# Patient Record
Sex: MALE | Race: WHITE | HISPANIC OR LATINO | URBAN - METROPOLITAN AREA
[De-identification: names, ages, dates, MRNs, and addresses within clinical notes are randomized per-mention and may not be internally consistent; named-entity substitution may affect disease eponyms.]

---

## 2017-06-08 VITALS
RESPIRATION RATE: 15 BRPM | HEART RATE: 58 BPM | SYSTOLIC BLOOD PRESSURE: 108 MMHG | OXYGEN SATURATION: 98 % | DIASTOLIC BLOOD PRESSURE: 65 MMHG | TEMPERATURE: 98 F

## 2017-06-08 RX ORDER — CHLORHEXIDINE GLUCONATE 213 G/1000ML
1 SOLUTION TOPICAL ONCE
Qty: 0 | Refills: 0 | Status: DISCONTINUED | OUTPATIENT
Start: 2017-06-09 | End: 2017-06-09

## 2017-06-08 NOTE — H&P ADULT - HISTORY OF PRESENT ILLNESS
History obtained from patient’s wife, **Wife bringing in all patient's medications, please confirm on arrival    68 y/o M, Samoan speaking, Former smoker (1ppd, quit 40 years ago) with PMHx HTN, HLD, DM, known CAD s/p multiple PCIs,  Alzheimer’s (diagnosed 2016), COPD presenting to cardiologist c/o stabbing non-exertional chest pain associated with SOB and dizziness. The pain is described as left-sided and mid-sternal burning/tightness unrelated to activity. The patient can only ambulate < 1 block before he must stop secondary to the CP and SOB. Pt denies diaphoresis, fatigue, LE edema, palpitations, orthopnea, PND, syncope. The patient’s last cardiac cath was a diagnostic Cardiac Cath 7/3/16 @ [place] revealing 2 vessel CAD (RPDA 30-50%, RPL1 90-95%, D1 50-60%, FFR of D1 0.88, patent stents in LCx, RPDA, proxLAD, EF 60%. Echocardiogram 1/5/17 showed impaired relaxation, mild mitral annular calcification, EF 56%, mild LVH. Duplex Carotid Ultrasound 1/5/17: Mild plaque with no evidence of significant stenosis. Echocardiogram from 4/18/17 showed normal LV function, moderate mitral annular calcification, mild LVH, indicative of abnormal diastolic filling pattern. In light of patient risk factors, NST results (report pending), and known CAD, patient is referred for recommended cardiac catheterization with possible intervention if clinically indicated. History obtained from patient’s wife, **Wife bringing in all patient's medications, please confirm on arrival    66 y/o M, Bulgarian speaking, Former smoker (1ppd, quit 40 years ago) with PMHx HTN, HLD, DM, known CAD s/p multiple PCIs (last intervention done in 2014 to the LAD),  Alzheimer’s (as per wife, diagnosed 2016), COPD presenting to cardiologist c/o stabbing chest pain associated with SOB and dizziness. The pain is described as left-sided and mid-sternal burning/tightness unrelated to activity. The patient can only ambulate < 1 block before he must stop secondary to the CP and SOB. He takes sublingual nitro which usually relieves his pain after the second dose. Pt denies diaphoresis, fatigue, LE edema, palpitations, orthopnea, PND, syncope. The patient’s last cardiac cath was a diagnostic Cardiac Cath 7/3/16 @ [place] revealing 2 vessel CAD (RPDA 30-50%, RPL1 90-95%, D1 50-60%, FFR of D1 0.88, patent stents in LCx, RPDA, proxLAD, EF 60%. The patient had NST 2/3/17 showing positive stress EKG with frequent PVC's throughout the exam. Small, mild, inferior wall defect that is partially reversible. Normal wall motion however cannot rule out mild ischemia, EF 59%. Since this stress test, he has had worsening episodes of this chest pain. In light of patient risk factors, NST results, and known CAD, patient is referred for recommended cardiac catheterization with possible intervention if clinically indicated.     Other Imaging Studies:  Echocardiogram 1/5/17 showed no pericardial effusion, mild mitral annular calcification, EF 56%, mild LVH, moderate MR, mild TR, E:A reversal consistent with impaired relaxation.   Duplex Carotid Ultrasound 1/5/17: Mild plaque with no evidence of significant stenosis.   Echocardiogram from 4/18/17 showed normal LV function, moderate mitral annular calcification, mild LVH, E:A reversal indicative of abnormal diastolic filling pattern. History obtained from patient’s wife    66 y/o M, Hungarian speaking, Former smoker (1ppd, quit 40 years ago) with PMHx HTN, HLD, DM, known CAD s/p multiple PCIs (last intervention done in 2014 to the LAD),  Alzheimer’s (as per wife, diagnosed 2016), COPD presenting to cardiologist c/o stabbing chest pain associated with SOB and dizziness. The pain is described as left-sided and mid-sternal burning/tightness unrelated to activity. The patient can only ambulate < 1 block before he must stop secondary to the CP and SOB. He takes sublingual nitro which usually relieves his pain after the second dose. Pt denies diaphoresis, fatigue, LE edema, palpitations, orthopnea, PND, syncope. The patient’s last cardiac cath was a diagnostic Cardiac Cath 7/3/16 @ [place] revealing 2 vessel CAD (RPDA 30-50%, RPL1 90-95%, D1 50-60%, FFR of D1 0.88, patent stents in LCx, RPDA, proxLAD, EF 60%. The patient had NST 2/3/17 showing positive stress EKG with frequent PVC's throughout the exam. Small, mild, inferior wall defect that is partially reversible. Normal wall motion however cannot rule out mild ischemia, EF 59%. Since this stress test, he has had worsening episodes of this chest pain. In light of patient risk factors, NST results, and known CAD, patient is referred for recommended cardiac catheterization with possible intervention if clinically indicated.     Other Imaging Studies:  Echocardiogram 1/5/17 showed no pericardial effusion, mild mitral annular calcification, EF 56%, mild LVH, moderate MR, mild TR, E:A reversal consistent with impaired relaxation.   Duplex Carotid Ultrasound 1/5/17: Mild plaque with no evidence of significant stenosis.   Echocardiogram from 4/18/17 showed normal LV function, moderate mitral annular calcification, mild LVH, E:A reversal indicative of abnormal diastolic filling pattern.

## 2017-06-08 NOTE — H&P ADULT - ASSESSMENT
66 y/o M, Niuean speaking, Former smoker (1ppd, quit 40 years ago) with PMHx HTN, HLD, DM, known CAD s/p multiple PCIs (last intervention done in 2014 to the LAD),  Alzheimer’s (as per wife, diagnosed 2016), COPD presenting to cardiologist c/o stabbing chest pain associated with SOB and dizziness.The patient had NST 2/3/17 showing positive stress EKG with frequent PVC's throughout the exam. Small, mild, inferior wall defect that is partially reversible. Normal wall motion however cannot rule out mild ischemia, EF 59%. Since this stress test, he has had worsening episodes of this chest pain. In light of patient risk factors, NST results, and known CAD, patient is referred for recommended cardiac catheterization with possible intervention if clinically indicated.     *Of note, patient's reports missing doses of his ASA/Plavix. Patient to be loaded with  mg and Plavix 600 mg prior to procedure. IVF @ 75cc/hr.     ASA III and Mallampati III    Consented patient and patient's wife with use of  #[id number]  Risks & benefits of procedure and alternative therapy have been explained to the patient including but not limited to: allergic reaction, bleeding w/possible need for blood transfusion, infection, renal and vascular compromise, limb damage, arrhythmia, stroke, vessel dissection/perforation, Myocardial infarction, emergent CABG. Informed consent obtained and in chart. 68 y/o M, [place] speaking, Former smoker (1ppd, quit 40 years ago) with PMHx HTN, HLD, DM, known CAD s/p multiple PCIs (last intervention done in 2014 to the LAD),  Alzheimer’s (as per wife, diagnosed 2016), COPD presenting to cardiologist c/o stabbing chest pain associated with SOB and dizziness.The patient had NST 2/3/17 showing positive stress EKG with frequent PVC's throughout the exam. Small, mild, inferior wall defect that is partially reversible. Normal wall motion however cannot rule out mild ischemia, EF 59%. Since this stress test, he has had worsening episodes of this chest pain. In light of patient risk factors, NST results, and known CAD, patient is referred for recommended cardiac catheterization with possible intervention if clinically indicated.     *Of note, patient's reports missing doses of his ASA/Plavix. Patient to be loaded with  mg and Plavix 600 mg prior to procedure. IVF @ 75cc/hr. Patient A&O x2, wife reports diagnosis of Alzheimers in 2016.    ASA III and Mallampati III    Consented patient and patient's wife with use of  #[id number]  Risks & benefits of procedure and alternative therapy have been explained to the patient including but not limited to: allergic reaction, bleeding w/possible need for blood transfusion, infection, renal and vascular compromise, limb damage, arrhythmia, stroke, vessel dissection/perforation, Myocardial infarction, emergent CABG. Informed consent obtained and in chart.

## 2017-06-09 ENCOUNTER — OUTPATIENT (OUTPATIENT)
Dept: OUTPATIENT SERVICES | Facility: HOSPITAL | Age: 67
LOS: 1 days | Discharge: NURSING, REHAB OR LONG-TERM CARE | End: 2017-06-09
Payer: MEDICARE

## 2017-06-09 DIAGNOSIS — Z90.89 ACQUIRED ABSENCE OF OTHER ORGANS: Chronic | ICD-10-CM

## 2017-06-09 DIAGNOSIS — Z98.890 OTHER SPECIFIED POSTPROCEDURAL STATES: Chronic | ICD-10-CM

## 2017-06-09 DIAGNOSIS — Z95.5 PRESENCE OF CORONARY ANGIOPLASTY IMPLANT AND GRAFT: ICD-10-CM

## 2017-06-09 DIAGNOSIS — I25.110 ATHEROSCLEROTIC HEART DISEASE OF NATIVE CORONARY ARTERY WITH UNSTABLE ANGINA PECTORIS: ICD-10-CM

## 2017-06-09 LAB
ALBUMIN SERPL ELPH-MCNC: 4.4 G/DL — SIGNIFICANT CHANGE UP (ref 3.3–5)
ALP SERPL-CCNC: 99 U/L — SIGNIFICANT CHANGE UP (ref 40–120)
ALT FLD-CCNC: 18 U/L — SIGNIFICANT CHANGE UP (ref 10–45)
ANION GAP SERPL CALC-SCNC: 10 MMOL/L — SIGNIFICANT CHANGE UP (ref 5–17)
APTT BLD: 35.1 SEC — SIGNIFICANT CHANGE UP (ref 27.5–37.4)
AST SERPL-CCNC: 14 U/L — SIGNIFICANT CHANGE UP (ref 10–40)
BASOPHILS NFR BLD AUTO: 0.1 % — SIGNIFICANT CHANGE UP (ref 0–2)
BILIRUB SERPL-MCNC: 0.4 MG/DL — SIGNIFICANT CHANGE UP (ref 0.2–1.2)
BUN SERPL-MCNC: 16 MG/DL — SIGNIFICANT CHANGE UP (ref 7–23)
CALCIUM SERPL-MCNC: 9.1 MG/DL — SIGNIFICANT CHANGE UP (ref 8.4–10.5)
CHLORIDE SERPL-SCNC: 95 MMOL/L — LOW (ref 96–108)
CHOLEST SERPL-MCNC: 232 MG/DL — HIGH (ref 10–199)
CO2 SERPL-SCNC: 28 MMOL/L — SIGNIFICANT CHANGE UP (ref 22–31)
CREAT SERPL-MCNC: 0.7 MG/DL — SIGNIFICANT CHANGE UP (ref 0.5–1.3)
CRP SERPL-MCNC: 0.3 MG/DL — SIGNIFICANT CHANGE UP (ref 0–0.4)
EOSINOPHIL NFR BLD AUTO: 1.1 % — SIGNIFICANT CHANGE UP (ref 0–6)
GLUCOSE SERPL-MCNC: 206 MG/DL — HIGH (ref 70–99)
HBA1C BLD-MCNC: 9.8 % — HIGH (ref 4–5.6)
HCT VFR BLD CALC: 43.7 % — SIGNIFICANT CHANGE UP (ref 39–50)
HDLC SERPL-MCNC: 51 MG/DL — SIGNIFICANT CHANGE UP (ref 40–125)
HGB BLD-MCNC: 15.4 G/DL — SIGNIFICANT CHANGE UP (ref 13–17)
INR BLD: 1.04 — SIGNIFICANT CHANGE UP (ref 0.88–1.16)
LIPID PNL WITH DIRECT LDL SERPL: 149 MG/DL — HIGH
LYMPHOCYTES # BLD AUTO: 35.6 % — SIGNIFICANT CHANGE UP (ref 13–44)
MCHC RBC-ENTMCNC: 31.1 PG — SIGNIFICANT CHANGE UP (ref 27–34)
MCHC RBC-ENTMCNC: 35.2 G/DL — SIGNIFICANT CHANGE UP (ref 32–36)
MCV RBC AUTO: 88.3 FL — SIGNIFICANT CHANGE UP (ref 80–100)
MONOCYTES NFR BLD AUTO: 7 % — SIGNIFICANT CHANGE UP (ref 2–14)
NEUTROPHILS NFR BLD AUTO: 56.2 % — SIGNIFICANT CHANGE UP (ref 43–77)
PLATELET # BLD AUTO: 245 K/UL — SIGNIFICANT CHANGE UP (ref 150–400)
POTASSIUM SERPL-MCNC: 4.3 MMOL/L — SIGNIFICANT CHANGE UP (ref 3.5–5.3)
POTASSIUM SERPL-SCNC: 4.3 MMOL/L — SIGNIFICANT CHANGE UP (ref 3.5–5.3)
PROT SERPL-MCNC: 8.1 G/DL — SIGNIFICANT CHANGE UP (ref 6–8.3)
PROTHROM AB SERPL-ACNC: 11.5 SEC — SIGNIFICANT CHANGE UP (ref 9.8–12.7)
RBC # BLD: 4.95 M/UL — SIGNIFICANT CHANGE UP (ref 4.2–5.8)
RBC # FLD: 12.3 % — SIGNIFICANT CHANGE UP (ref 10.3–16.9)
SODIUM SERPL-SCNC: 133 MMOL/L — LOW (ref 135–145)
TOTAL CHOLESTEROL/HDL RATIO MEASUREMENT: 4.5 RATIO — SIGNIFICANT CHANGE UP (ref 3.4–9.6)
TRIGL SERPL-MCNC: 158 MG/DL — HIGH (ref 10–149)
WBC # BLD: 7 K/UL — SIGNIFICANT CHANGE UP (ref 3.8–10.5)
WBC # FLD AUTO: 7 K/UL — SIGNIFICANT CHANGE UP (ref 3.8–10.5)

## 2017-06-09 PROCEDURE — 85025 COMPLETE CBC W/AUTO DIFF WBC: CPT

## 2017-06-09 PROCEDURE — 86140 C-REACTIVE PROTEIN: CPT

## 2017-06-09 PROCEDURE — 83036 HEMOGLOBIN GLYCOSYLATED A1C: CPT

## 2017-06-09 PROCEDURE — 93458 L HRT ARTERY/VENTRICLE ANGIO: CPT | Mod: 26

## 2017-06-09 PROCEDURE — 85730 THROMBOPLASTIN TIME PARTIAL: CPT

## 2017-06-09 PROCEDURE — 80061 LIPID PANEL: CPT

## 2017-06-09 PROCEDURE — 85610 PROTHROMBIN TIME: CPT

## 2017-06-09 PROCEDURE — C1769: CPT

## 2017-06-09 PROCEDURE — 93010 ELECTROCARDIOGRAM REPORT: CPT

## 2017-06-09 PROCEDURE — 93458 L HRT ARTERY/VENTRICLE ANGIO: CPT

## 2017-06-09 PROCEDURE — 82550 ASSAY OF CK (CPK): CPT

## 2017-06-09 PROCEDURE — 80053 COMPREHEN METABOLIC PANEL: CPT

## 2017-06-09 PROCEDURE — 82553 CREATINE MB FRACTION: CPT

## 2017-06-09 PROCEDURE — C1887: CPT

## 2017-06-09 PROCEDURE — 93005 ELECTROCARDIOGRAM TRACING: CPT

## 2017-06-09 RX ORDER — ATORVASTATIN CALCIUM 80 MG/1
1 TABLET, FILM COATED ORAL
Qty: 0 | Refills: 0 | COMMUNITY

## 2017-06-09 RX ORDER — UMECLIDINIUM 62.5 UG/1
0 AEROSOL, POWDER ORAL
Qty: 0 | Refills: 0 | COMMUNITY

## 2017-06-09 RX ORDER — MEMANTINE HYDROCHLORIDE 10 MG/1
0 TABLET ORAL
Qty: 0 | Refills: 0 | COMMUNITY

## 2017-06-09 RX ORDER — METOPROLOL TARTRATE 50 MG
12.5 TABLET ORAL
Qty: 0 | Refills: 0 | COMMUNITY

## 2017-06-09 RX ORDER — ARIPIPRAZOLE 15 MG/1
1 TABLET ORAL
Qty: 0 | Refills: 0 | COMMUNITY

## 2017-06-09 RX ORDER — ASPIRIN/CALCIUM CARB/MAGNESIUM 324 MG
1 TABLET ORAL
Qty: 0 | Refills: 0 | COMMUNITY

## 2017-06-09 RX ORDER — CLOPIDOGREL BISULFATE 75 MG/1
600 TABLET, FILM COATED ORAL ONCE
Qty: 0 | Refills: 0 | Status: COMPLETED | OUTPATIENT
Start: 2017-06-09 | End: 2017-06-09

## 2017-06-09 RX ORDER — LISINOPRIL 2.5 MG/1
0 TABLET ORAL
Qty: 0 | Refills: 0 | COMMUNITY

## 2017-06-09 RX ORDER — LISINOPRIL 2.5 MG/1
1 TABLET ORAL
Qty: 0 | Refills: 0 | COMMUNITY

## 2017-06-09 RX ORDER — SODIUM CHLORIDE 9 MG/ML
500 INJECTION INTRAMUSCULAR; INTRAVENOUS; SUBCUTANEOUS
Qty: 0 | Refills: 0 | Status: DISCONTINUED | OUTPATIENT
Start: 2017-06-09 | End: 2017-06-09

## 2017-06-09 RX ORDER — METFORMIN HYDROCHLORIDE 850 MG/1
1 TABLET ORAL
Qty: 0 | Refills: 0 | COMMUNITY

## 2017-06-09 RX ORDER — ISOSORBIDE MONONITRATE 60 MG/1
60 TABLET, EXTENDED RELEASE ORAL
Qty: 0 | Refills: 0 | COMMUNITY

## 2017-06-09 RX ORDER — PRAZOSIN HCL 2 MG
0 CAPSULE ORAL
Qty: 0 | Refills: 0 | COMMUNITY

## 2017-06-09 RX ORDER — NITROGLYCERIN 6.5 MG
1 CAPSULE, EXTENDED RELEASE ORAL
Qty: 0 | Refills: 0 | COMMUNITY

## 2017-06-09 RX ORDER — NITROGLYCERIN 6.5 MG
0 CAPSULE, EXTENDED RELEASE ORAL
Qty: 0 | Refills: 0 | COMMUNITY

## 2017-06-09 RX ORDER — RANITIDINE HYDROCHLORIDE 150 MG/1
1 TABLET, FILM COATED ORAL
Qty: 0 | Refills: 0 | COMMUNITY

## 2017-06-09 RX ORDER — CLOPIDOGREL BISULFATE 75 MG/1
1 TABLET, FILM COATED ORAL
Qty: 0 | Refills: 0 | COMMUNITY

## 2017-06-09 RX ORDER — ALBUTEROL 90 UG/1
0 AEROSOL, METERED ORAL
Qty: 0 | Refills: 0 | COMMUNITY

## 2017-06-09 RX ORDER — DUTASTERIDE 0.5 MG/1
1 CAPSULE, LIQUID FILLED ORAL
Qty: 0 | Refills: 0 | COMMUNITY

## 2017-06-09 RX ORDER — RANOLAZINE 500 MG/1
1 TABLET, FILM COATED, EXTENDED RELEASE ORAL
Qty: 0 | Refills: 0 | COMMUNITY

## 2017-06-09 RX ORDER — ASPIRIN/CALCIUM CARB/MAGNESIUM 324 MG
325 TABLET ORAL ONCE
Qty: 0 | Refills: 0 | Status: COMPLETED | OUTPATIENT
Start: 2017-06-09 | End: 2017-06-09

## 2017-06-09 RX ADMIN — CLOPIDOGREL BISULFATE 600 MILLIGRAM(S): 75 TABLET, FILM COATED ORAL at 14:35

## 2017-06-09 RX ADMIN — SODIUM CHLORIDE 75 MILLILITER(S): 9 INJECTION INTRAMUSCULAR; INTRAVENOUS; SUBCUTANEOUS at 14:35

## 2017-06-09 RX ADMIN — Medication 325 MILLIGRAM(S): at 14:35

## 2017-06-09 NOTE — PROGRESS NOTE ADULT - SUBJECTIVE AND OBJECTIVE BOX
Interventional Cardiology PA SDA Discharge Note    Patient without complaints. Ambulated and voided without difficulties    Afebrile, VSS    Ext:    Right Radial : no   hematoma,   no  bleeding, dressing; C/D/I      Pulses:    intact RAD 2+    A/P:    68 y/o M, Tajik speaking, Former smoker (1ppd, quit 40 years ago) with PMHx HTN, HLD, DM, known CAD s/p multiple PCIs (last intervention done in 2014 to the LAD),  Alzheimer’s (as per wife, diagnosed 2016), COPD presenting to cardiologist c/o stabbing chest pain associated with SOB and dizziness.The patient had NST 2/3/17 showing positive stress EKG with frequent PVC's throughout the exam. Small, mild, inferior wall defect that is partially reversible. Normal wall motion however cannot rule out mild ischemia, EF 59%. Since this stress test, he has had worsening episodes of this chest pain. In light of patient risk factors, NST results, and known CAD, patient is referred for recommended cardiac catheterization            1.	Stable for discharge as per attending Dr. RICKY Dejesus  after radial band removal of  wrist stable.  2.	Follow-up with PMD/Cardiologist _Dr Dejesus  in 1-2 weeks  3.	Discharged forms signed and copies in chart Interventional Cardiology PA SDA Discharge Note    s/p cardiac cath  6/9/17  via R radial revealing non obstructive coronaries with patent prior stents, nl EF and no significant valvular disease    Patient without complaints. Ambulated and voided without difficulties    Afebrile, VSS    Ext:    Right Radial : no   hematoma,   no  bleeding, dressing; C/D/I      Pulses:    intact RAD 2+    A/P:    68 y/o M, Mongolian speaking, Former smoker (1ppd, quit 40 years ago) with PMHx HTN, HLD, DM, known CAD s/p multiple PCIs (last intervention done in 2014 to the LAD),  Alzheimer’s (as per wife, diagnosed 2016), COPD presenting to cardiologist c/o stabbing chest pain associated with SOB and dizziness.The patient had NST 2/3/17 showing positive stress EKG with frequent PVC's throughout the exam. Small, mild, inferior wall defect that is partially reversible. Normal wall motion however cannot rule out mild ischemia, EF 59%. Since this stress test, he has had worsening episodes of this chest pain. In light of patient risk factors, NST results, and known CAD, patient is referred for recommended cardiac catheterization  s/p cardiac cath today  via R radial revealing non obstructive coronaries with patent prior stents, nl EF and no significant valvular disease      1.	Stable for discharge as per attending Dr. RICKY Dejesus  after radial band removal of  wrist stable.  2.	Follow-up with PMD/Cardiologist _Dr Dejesus  in 1-2 weeks  3.	Discharged forms signed and copies in chart

## 2019-07-25 NOTE — H&P ADULT - NSHPPOADEEPVENOUSTHROMB_GEN_A_CORE
Not in acute exacerbation    -Continue with daily inhalers: Breo Ellipta 200-25 mcg QD and Spiriva 18 mcg    -Proventil HFA PRN  -Continue Theophylline 200 mg QD, and Montelukast 10 mg daily   -For cough control take as needed Tessalon perles 100 mg TID  -O2 supply, 1L at bedtime electronically ordered  Mostly for comfort measures   Maintain SpO2 between 88-92% no